# Patient Record
Sex: FEMALE | Race: WHITE | Employment: OTHER | URBAN - METROPOLITAN AREA
[De-identification: names, ages, dates, MRNs, and addresses within clinical notes are randomized per-mention and may not be internally consistent; named-entity substitution may affect disease eponyms.]

---

## 2020-08-24 ENCOUNTER — APPOINTMENT (OUTPATIENT)
Dept: GENERAL RADIOLOGY | Age: 85
End: 2020-08-24
Attending: EMERGENCY MEDICINE
Payer: MEDICARE

## 2020-08-24 ENCOUNTER — APPOINTMENT (OUTPATIENT)
Dept: CT IMAGING | Age: 85
End: 2020-08-24
Attending: EMERGENCY MEDICINE
Payer: MEDICARE

## 2020-08-24 ENCOUNTER — HOSPITAL ENCOUNTER (EMERGENCY)
Age: 85
Discharge: HOME | End: 2020-08-24
Attending: EMERGENCY MEDICINE
Payer: MEDICARE

## 2020-08-24 VITALS
HEIGHT: 62 IN | WEIGHT: 110 LBS | RESPIRATION RATE: 17 BRPM | BODY MASS INDEX: 20.24 KG/M2 | TEMPERATURE: 98.4 F | OXYGEN SATURATION: 99 % | DIASTOLIC BLOOD PRESSURE: 42 MMHG | HEART RATE: 64 BPM | SYSTOLIC BLOOD PRESSURE: 125 MMHG
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
## 2020-08-24 DIAGNOSIS — N39.0 ACUTE UTI: Primary | ICD-10-CM

## 2020-08-24 LAB
ALBUMIN SERPL-MCNC: 3.3 G/DL (ref 3.5–5)
ALBUMIN/GLOB SERPL: 0.9 {RATIO} (ref 1.1–2.2)
ALP SERPL-CCNC: 61 U/L (ref 45–117)
ALT SERPL-CCNC: 11 U/L (ref 12–78)
ANION GAP SERPL CALC-SCNC: 5 MMOL/L (ref 5–15)
APPEARANCE UR: CLEAR
AST SERPL-CCNC: 18 U/L (ref 15–37)
BACTERIA URNS QL MICRO: ABNORMAL /HPF
BASOPHILS # BLD: 0 K/UL (ref 0–0.1)
BASOPHILS NFR BLD: 1 % (ref 0–1)
BILIRUB SERPL-MCNC: 0.5 MG/DL (ref 0.2–1)
BILIRUB UR QL: NEGATIVE
BUN SERPL-MCNC: 25 MG/DL (ref 6–20)
BUN/CREAT SERPL: 18 (ref 12–20)
CALCIUM SERPL-MCNC: 8.9 MG/DL (ref 8.5–10.1)
CHLORIDE SERPL-SCNC: 105 MMOL/L (ref 97–108)
CO2 SERPL-SCNC: 29 MMOL/L (ref 21–32)
COLOR UR: ABNORMAL
CREAT SERPL-MCNC: 1.42 MG/DL (ref 0.55–1.02)
DIFFERENTIAL METHOD BLD: ABNORMAL
EOSINOPHIL # BLD: 0.1 K/UL (ref 0–0.4)
EOSINOPHIL NFR BLD: 2 % (ref 0–7)
EPITH CASTS URNS QL MICRO: ABNORMAL /LPF
ERYTHROCYTE [DISTWIDTH] IN BLOOD BY AUTOMATED COUNT: 16.3 % (ref 11.5–14.5)
GLOBULIN SER CALC-MCNC: 3.5 G/DL (ref 2–4)
GLUCOSE SERPL-MCNC: 98 MG/DL (ref 65–100)
GLUCOSE UR STRIP.AUTO-MCNC: NEGATIVE MG/DL
HCT VFR BLD AUTO: 31.9 % (ref 35–47)
HGB BLD-MCNC: 10.1 G/DL (ref 11.5–16)
HGB UR QL STRIP: ABNORMAL
HYALINE CASTS URNS QL MICRO: ABNORMAL /LPF (ref 0–5)
IMM GRANULOCYTES # BLD AUTO: 0 K/UL (ref 0–0.04)
IMM GRANULOCYTES NFR BLD AUTO: 1 % (ref 0–0.5)
KETONES UR QL STRIP.AUTO: NEGATIVE MG/DL
LEUKOCYTE ESTERASE UR QL STRIP.AUTO: ABNORMAL
LYMPHOCYTES # BLD: 0.9 K/UL (ref 0.8–3.5)
LYMPHOCYTES NFR BLD: 23 % (ref 12–49)
MCH RBC QN AUTO: 29.6 PG (ref 26–34)
MCHC RBC AUTO-ENTMCNC: 31.7 G/DL (ref 30–36.5)
MCV RBC AUTO: 93.5 FL (ref 80–99)
MONOCYTES # BLD: 0.3 K/UL (ref 0–1)
MONOCYTES NFR BLD: 7 % (ref 5–13)
NEUTS SEG # BLD: 2.6 K/UL (ref 1.8–8)
NEUTS SEG NFR BLD: 66 % (ref 32–75)
NITRITE UR QL STRIP.AUTO: NEGATIVE
NRBC # BLD: 0 K/UL (ref 0–0.01)
NRBC BLD-RTO: 0 PER 100 WBC
PH UR STRIP: 7 [PH] (ref 5–8)
PLATELET # BLD AUTO: 160 K/UL (ref 150–400)
PMV BLD AUTO: 9.2 FL (ref 8.9–12.9)
POTASSIUM SERPL-SCNC: 4.1 MMOL/L (ref 3.5–5.1)
PROT SERPL-MCNC: 6.8 G/DL (ref 6.4–8.2)
PROT UR STRIP-MCNC: NEGATIVE MG/DL
RBC # BLD AUTO: 3.41 M/UL (ref 3.8–5.2)
RBC #/AREA URNS HPF: ABNORMAL /HPF (ref 0–5)
SODIUM SERPL-SCNC: 139 MMOL/L (ref 136–145)
SP GR UR REFRACTOMETRY: 1.01 (ref 1–1.03)
TROPONIN I SERPL-MCNC: <0.05 NG/ML
UA: UC IF INDICATED,UAUC: ABNORMAL
UROBILINOGEN UR QL STRIP.AUTO: 1 EU/DL (ref 0.2–1)
WBC # BLD AUTO: 3.8 K/UL (ref 3.6–11)
WBC URNS QL MICRO: ABNORMAL /HPF (ref 0–4)

## 2020-08-24 PROCEDURE — 87086 URINE CULTURE/COLONY COUNT: CPT

## 2020-08-24 PROCEDURE — 84484 ASSAY OF TROPONIN QUANT: CPT

## 2020-08-24 PROCEDURE — 73502 X-RAY EXAM HIP UNI 2-3 VIEWS: CPT

## 2020-08-24 PROCEDURE — 85025 COMPLETE CBC W/AUTO DIFF WBC: CPT

## 2020-08-24 PROCEDURE — 80053 COMPREHEN METABOLIC PANEL: CPT

## 2020-08-24 PROCEDURE — 81001 URINALYSIS AUTO W/SCOPE: CPT

## 2020-08-24 PROCEDURE — 99284 EMERGENCY DEPT VISIT MOD MDM: CPT

## 2020-08-24 PROCEDURE — 87077 CULTURE AEROBIC IDENTIFY: CPT

## 2020-08-24 PROCEDURE — 70450 CT HEAD/BRAIN W/O DYE: CPT

## 2020-08-24 PROCEDURE — 72100 X-RAY EXAM L-S SPINE 2/3 VWS: CPT

## 2020-08-24 PROCEDURE — 36415 COLL VENOUS BLD VENIPUNCTURE: CPT

## 2020-08-24 PROCEDURE — 87186 SC STD MICRODIL/AGAR DIL: CPT

## 2020-08-24 RX ORDER — CEPHALEXIN 500 MG/1
500 CAPSULE ORAL 3 TIMES DAILY
Qty: 21 CAP | Refills: 0 | Status: SHIPPED | OUTPATIENT
Start: 2020-08-24 | End: 2020-08-31

## 2020-08-24 NOTE — DISCHARGE INSTRUCTIONS
Patient Education        Preventing Falls: Care Instructions  Your Care Instructions    Getting around your home safely can be a challenge if you have injuries or health problems that make it easy for you to fall. Loose rugs and furniture in walkways are among the dangers for many older people who have problems walking or who have poor eyesight. People who have conditions such as arthritis, osteoporosis, or dementia also have to be careful not to fall. You can make your home safer with a few simple measures. Follow-up care is a key part of your treatment and safety. Be sure to make and go to all appointments, and call your doctor if you are having problems. It's also a good idea to know your test results and keep a list of the medicines you take. How can you care for yourself at home? Taking care of yourself  · You may get dizzy if you do not drink enough water. To prevent dehydration, drink plenty of fluids, enough so that your urine is light yellow or clear like water. Choose water and other caffeine-free clear liquids. If you have kidney, heart, or liver disease and have to limit fluids, talk with your doctor before you increase the amount of fluids you drink. · Exercise regularly to improve your strength, muscle tone, and balance. Walk if you can. Swimming may be a good choice if you cannot walk easily. · Have your vision and hearing checked each year or any time you notice a change. If you have trouble seeing and hearing, you might not be able to avoid objects and could lose your balance. · Know the side effects of the medicines you take. Ask your doctor or pharmacist whether the medicines you take can affect your balance. Sleeping pills or sedatives can affect your balance. · Limit the amount of alcohol you drink. Alcohol can impair your balance and other senses. · Ask your doctor whether calluses or corns on your feet need to be removed.  If you wear loose-fitting shoes because of calluses or corns, you can lose your balance and fall. · Talk to your doctor if you have numbness in your feet. Preventing falls at home  · Remove raised doorway thresholds, throw rugs, and clutter. Repair loose carpet or raised areas in the floor. · Move furniture and electrical cords to keep them out of walking paths. · Use nonskid floor wax, and wipe up spills right away, especially on ceramic tile floors. · If you use a walker or cane, put rubber tips on it. If you use crutches, clean the bottoms of them regularly with an abrasive pad, such as steel wool. · Keep your house well lit, especially [place], and outside walkways. Use night-lights in areas such as hallways and bathrooms. Add extra light switches or use remote switches (such as switches that go on or off when you clap your hands) to make it easier to turn lights on if you have to get up during the night. · Install sturdy handrails on stairways. · Move items in your cabinets so that the things you use a lot are on the lower shelves (about waist level). · Keep a cordless phone and a flashlight with new batteries by your bed. If possible, put a phone in each of the main rooms of your house, or carry a cell phone in case you fall and cannot reach a phone. Or, you can wear a device around your neck or wrist. You push a button that sends a signal for help. · Wear low-heeled shoes that fit well and give your feet good support. Use footwear with nonskid soles. Check the heels and soles of your shoes for wear. Repair or replace worn heels or soles. · Do not wear socks without shoes on wood floors. · Walk on the grass when the sidewalks are slippery. If you live in an area that gets snow and ice in the winter, sprinkle salt on slippery steps and sidewalks. Preventing falls in the bath  · Install grab bars and nonskid mats inside and outside your shower or tub and near the toilet and sinks. · Use shower chairs and bath benches.   · Use a hand-held shower head that will allow you to sit while showering. · Get into a tub or shower by putting the weaker leg in first. Get out of a tub or shower with your strong side first.  · Repair loose toilet seats and consider installing a raised toilet seat to make getting on and off the toilet easier. · Keep your bathroom door unlocked while you are in the shower. Where can you learn more? Go to http://alice-phuong.info/. Enter [id number] [id number] [phone] in the search box to learn more about \"Preventing Falls: Care Instructions. \"  Current as of: March 16, 2018  Content Version: 11.8  © 3556-3230 [employer], [employer]. Care instructions adapted under license by [employer] (which disclaims liability or warranty for this information). If you have questions about a medical condition or this instruction, always ask your healthcare professional. [address] any warranty or liability for your use of this information.

## 2020-08-24 NOTE — PROGRESS NOTES
UPDATE: 1:30pm - Met with patient and son in ED room. Son indicates that he currently has a nurse that he has hired to assist patient for 4 hours a day and has a friend that comes to stay with patient every other day. Son is using patient's money that was set aside for LTC services to pay nurse $25 an hour. He is aware that he will need to increase hours with current nurse or see if friends/family can help with covering other hours so that patient is not home alone. CM provided contact information for [employer] and [facility] [email or web address] to begin process of applying for Medicaid and helping with additional care providers in the home. Son encouraged to call [employer] this afternoon to discuss current situation and need for assistance with obtaining additional caregiver help in the home. No other resources identified for patient and family at this time. Pt ready for discharge from a CM standpoint. Original Note:  CM acknowledges consult requesting \"help at home\" for patient. CM to meet with patient and determine level of need and current resources. Farrukh Amador, [address] [place] - [address] [place]  [employer] Facilitator  Zone Phone: [phone]

## 2020-08-24 NOTE — ED NOTES
Dr. Max Mandel reviewed discharge instructions with the patient and caregiver. The patient and caregiver verbalized understanding.

## 2020-08-24 NOTE — ED PROVIDER NOTES
EMERGENCY DEPARTMENT HISTORY AND PHYSICAL EXAM      Date: 8/24/2020  Patient Name: Cecil Noble    History of Presenting Illness     Chief Complaint   Patient presents with    Fall     Patient states yesterday she had a GLF, pt states she is unsure what caused the fall, and is unsure if she hit her head or had any LOC. pt has confusion at baseline, and home health during the day. Pt states she is having L hip/ left side pain.  Hip Pain       History Provided By: Patient, patient's son    HPI: Cecil Noble, 80 y.o. female with PMHx significant for hypertension, hyperlipidemia, dementia, who presents following a fall at home. Patient apparently lives alone but has nurses who come in for several hours a day. When the nurse arrived this morning she told them that she had fallen in the bathroom and was complaining of left hip and buttocks pain, however has been able to ambulate. Nurse therefore sent her to the emergency department for evaluation. Patient is overall very poor historian secondary to her dementia. She actually has no complaints on my evaluation but tends to wander with any attempt to answer questions. Additional history review of systems is limited by dementia. PCP: Thaddeus Mera MD      Current Outpatient Medications   Medication Sig Dispense Refill    cephALEXin (Keflex) 500 mg capsule Take 1 Cap by mouth three (3) times daily for 7 days. 21 Cap 0    potassium chloride SR (KLOR-CON 10) 10 mEq tablet Take 20 mEq by mouth three (3) times daily. Indications: HYPOKALEMIA      Levothyroxine 50 mcg Cap Take 50 mcg by mouth daily. Indications: HYPOTHYROIDISM      hydrochlorothiazide (HYDRODIURIL) 25 mg tablet Take 50 mg by mouth daily. Indications: HYPERTENSION      amlodipine (NORVASC) 5 mg tablet Take 5 mg by mouth daily. Indications: HYPERTENSION      lisinopril (PRINIVIL, ZESTRIL) 20 mg tablet Take 20 mg by mouth daily.       simvastatin (ZOCOR) 20 mg tablet Take 20 mg by mouth nightly.  CALCIUM CARBONATE/VITAMIN D3 (CALCIUM 600 + D,3, PO) Take 1,200 mg by mouth daily.  aspirin delayed-release (ASPIR-LOW) 81 mg tablet Take 81 mg by mouth daily. Past History     Past Medical History:  Past Medical History:   Diagnosis Date    Hypertension     Other ill-defined conditions     Hypokalemia    Other ill-defined conditions     Hypothyroidism    Other ill-defined conditions     Hypercholesterolemia     Past Surgical History:  Past Surgical History:   Procedure Laterality Date    ABDOMEN SURGERY PROC UNLISTED      adhesions removed from intestines    HX APPENDECTOMY      HX  SECTION      HX OOPHORECTOMY      Lt due to fibroid    HX ORTHOPAEDIC      Lt arm Fx    HX OTHER SURGICAL      Hemmorrhoidectomy    SIGMOIDOSCOPY,DIAGNOSTIC  2011          Family History:  No family history on file. Social History:  Social History     Tobacco Use    Smoking status: Never Smoker   Substance Use Topics    Alcohol use: No    Drug use: Not on file     Allergies: Allergies   Allergen Reactions    Penicillin G Nausea Only     Review of Systems   Review of Systems   Unable to perform ROS: Dementia     Physical Exam   Physical Exam  Vitals signs and nursing note reviewed. Constitutional:       General: She is not in acute distress. Appearance: She is well-developed. HENT:      Head: Normocephalic and atraumatic. Eyes:      Conjunctiva/sclera: Conjunctivae normal.      Pupils: Pupils are equal, round, and reactive to light. Neck:      Musculoskeletal: Normal range of motion. Cardiovascular:      Rate and Rhythm: Normal rate and regular rhythm. Pulmonary:      Effort: Pulmonary effort is normal. No respiratory distress. Breath sounds: Normal breath sounds. No stridor. Abdominal:      General: There is no distension. Palpations: Abdomen is soft. Tenderness: There is no abdominal tenderness. Musculoskeletal: Normal range of motion. Comments: Tenderness over left hip   Skin:     General: Skin is warm and dry. Neurological:      Mental Status: She is alert and oriented to person, place, and time. Diagnostic Study Results   Labs -     Recent Results (from the past 12 hour(s))   CBC WITH AUTOMATED DIFF    Collection Time: 08/24/20 10:18 AM   Result Value Ref Range    WBC 3.8 3.6 - 11.0 K/uL    RBC 3.41 (L) 3.80 - 5.20 M/uL    HGB 10.1 (L) 11.5 - 16.0 g/dL    HCT 31.9 (L) 35.0 - 47.0 %    MCV 93.5 80.0 - 99.0 FL    MCH 29.6 26.0 - 34.0 PG    MCHC 31.7 30.0 - 36.5 g/dL    RDW 16.3 (H) 11.5 - 14.5 %    PLATELET 734 723 - 701 K/uL    MPV 9.2 8.9 - 12.9 FL    NRBC 0.0 0  WBC    ABSOLUTE NRBC 0.00 0.00 - 0.01 K/uL    NEUTROPHILS 66 32 - 75 %    LYMPHOCYTES 23 12 - 49 %    MONOCYTES 7 5 - 13 %    EOSINOPHILS 2 0 - 7 %    BASOPHILS 1 0 - 1 %    IMMATURE GRANULOCYTES 1 (H) 0.0 - 0.5 %    ABS. NEUTROPHILS 2.6 1.8 - 8.0 K/UL    ABS. LYMPHOCYTES 0.9 0.8 - 3.5 K/UL    ABS. MONOCYTES 0.3 0.0 - 1.0 K/UL    ABS. EOSINOPHILS 0.1 0.0 - 0.4 K/UL    ABS. BASOPHILS 0.0 0.0 - 0.1 K/UL    ABS. IMM. GRANS. 0.0 0.00 - 0.04 K/UL    DF AUTOMATED     METABOLIC PANEL, COMPREHENSIVE    Collection Time: 08/24/20 10:18 AM   Result Value Ref Range    Sodium 139 136 - 145 mmol/L    Potassium 4.1 3.5 - 5.1 mmol/L    Chloride 105 97 - 108 mmol/L    CO2 29 21 - 32 mmol/L    Anion gap 5 5 - 15 mmol/L    Glucose 98 65 - 100 mg/dL    BUN 25 (H) 6 - 20 MG/DL    Creatinine 1.42 (H) 0.55 - 1.02 MG/DL    BUN/Creatinine ratio 18 12 - 20      GFR est AA 42 (L) >60 ml/min/1.73m2    GFR est non-AA 35 (L) >60 ml/min/1.73m2    Calcium 8.9 8.5 - 10.1 MG/DL    Bilirubin, total 0.5 0.2 - 1.0 MG/DL    ALT (SGPT) 11 (L) 12 - 78 U/L    AST (SGOT) 18 15 - 37 U/L    Alk.  phosphatase 61 45 - 117 U/L    Protein, total 6.8 6.4 - 8.2 g/dL    Albumin 3.3 (L) 3.5 - 5.0 g/dL    Globulin 3.5 2.0 - 4.0 g/dL    A-G Ratio 0.9 (L) 1.1 - 2.2     TROPONIN I    Collection Time: 08/24/20 10:18 AM   Result Value Ref Range    Troponin-I, Qt. <0.05 <0.05 ng/mL   URINALYSIS W/ REFLEX CULTURE    Collection Time: 08/24/20 11:59 AM    Specimen: Urine   Result Value Ref Range    Color YELLOW/STRAW      Appearance CLEAR CLEAR      Specific gravity 1.010 1.003 - 1.030      pH (UA) 7.0 5.0 - 8.0      Protein Negative NEG mg/dL    Glucose Negative NEG mg/dL    Ketone Negative NEG mg/dL    Bilirubin Negative NEG      Blood SMALL (A) NEG      Urobilinogen 1.0 0.2 - 1.0 EU/dL    Nitrites Negative NEG      Leukocyte Esterase SMALL (A) NEG      WBC 5-10 0 - 4 /hpf    RBC 5-10 0 - 5 /hpf    Epithelial cells FEW FEW /lpf    Bacteria 4+ (A) NEG /hpf    UA:UC IF INDICATED CULTURE NOT INDICATED BY UA RESULT CNI      Hyaline cast 0-2 0 - 5 /lpf       Radiologic Studies -   XR HIP LT W OR WO PELV 2-3 VWS   Final Result   IMPRESSION:    No acute abnormality. XR SPINE LUMB 2 OR 3 V   Final Result   IMPRESSION: Grade 1 anterolisthesis of L4. Scoliosis. L5-S1 facet arthropathy                  CT HEAD WO CONT   Final Result   IMPRESSION:    No acute intracranial process is identified. Xr Spine Lumb 2 Or 3 V    Result Date: 8/24/2020  IMPRESSION: Grade 1 anterolisthesis of L4. Scoliosis. L5-S1 facet arthropathy     Xr Hip Lt W Or Wo Pelv 2-3 Vws    Result Date: 8/24/2020  IMPRESSION: No acute abnormality. Ct Head Wo Cont    Result Date: 8/24/2020  IMPRESSION: No acute intracranial process is identified. Medical Decision Making   I am the first provider for this patient. I reviewed the vital signs, available nursing notes, past medical history, past surgical history, family history and social history. Vital Signs-Reviewed the patient's vital signs.   Patient Vitals for the past 12 hrs:   Temp Pulse Resp BP SpO2   08/24/20 1315  64 17 125/42 99 %   08/24/20 1245  61 14 120/43 100 %   08/24/20 1215  (!) 58 14 118/41 99 %   08/24/20 1145  60 18 (!) 116/38 98 %   08/24/20 1115  62 20 115/41 100 %   08/24/20 1045  (!) 59 13 (!) 111/39 100 %   08/24/20 1026     99 %   08/24/20 1025    122/48    08/24/20 0830 98.4 °F (36.9 °C) 69 18 104/81 100 %       Pulse Oximetry Analysis - 100% on ra      Records Reviewed: Nursing Notes and Old Medical Records    Provider Notes (Medical Decision Making):   Patient presents after a fall at home reportedly last night the patient is a very difficult historian. She has no obvious signs of trauma on exam.  Given unclear history will check imaging of her hip and L-spine as well as a CT of her head basic lab work, urinalysis, troponin. Leave on the cardiac monitor. Will also have case management see the patient to see if there are additional resources that could be helpful at home. ED Course:   Initial assessment performed. The patients presenting problems have been discussed, and they are in agreement with the care plan formulated and outlined with them. I have encouraged them to ask questions as they arise throughout their visit. Urinalysis with signs of UTI. Will treat with antibiotics. Additional lab work and imaging unremarkable. Procedures:  Procedures    Critical Care:  none    Disposition:  Discharge Note:  1:42 PM  The patient has been re-evaluated and is ready for discharge. Reviewed available results with patient. Counseled patient on diagnosis and care plan. Patient has expressed understanding, and all questions have been answered. Patient agrees with plan and agrees to follow up as recommended, or to return to the ED if their symptoms worsen. Discharge instructions have been provided and explained to the patient, along with reasons to return to the ED. PLAN:  1. Discharge Medication List as of 8/24/2020  1:42 PM        2.    Follow-up Information     Follow up With Specialties Details Why Contact Info    Andrey López MD Internal Medicine Schedule an appointment as soon as possible for a visit  [place] [address]  [phone]      [facility] EMERGENCY DEPT Emergency Medicine  As needed, If symptoms worsen [address]  [address] [facility]  [phone]        Return to ED if worse     Diagnosis     Clinical Impression:   1. Acute UTI    2. Fall in home, initial encounter        This note will not be viewable in [address] Please note that this dictation was completed with [employer], the computer voice recognition software. Quite often unanticipated grammatical, syntax, homophones, and other interpretive errors are inadvertently transcribed by the computer software. Please disregard these errors.   Please excuse any errors that have escaped final proofreading

## 2020-08-26 LAB
BACTERIA SPEC CULT: ABNORMAL
CC UR VC: ABNORMAL
SERVICE CMNT-IMP: ABNORMAL

## 2020-09-16 ENCOUNTER — APPOINTMENT (OUTPATIENT)
Dept: CT IMAGING | Age: 85
End: 2020-09-16
Attending: EMERGENCY MEDICINE
Payer: MEDICARE

## 2020-09-16 ENCOUNTER — HOSPITAL ENCOUNTER (EMERGENCY)
Age: 85
Discharge: HOME | End: 2020-09-16
Attending: EMERGENCY MEDICINE
Payer: MEDICARE

## 2020-09-16 ENCOUNTER — APPOINTMENT (OUTPATIENT)
Dept: GENERAL RADIOLOGY | Age: 85
End: 2020-09-16
Attending: EMERGENCY MEDICINE
Payer: MEDICARE

## 2020-09-16 VITALS
OXYGEN SATURATION: 98 % | HEART RATE: 66 BPM | RESPIRATION RATE: 18 BRPM | DIASTOLIC BLOOD PRESSURE: 59 MMHG | SYSTOLIC BLOOD PRESSURE: 110 MMHG | TEMPERATURE: 98.5 F

## 2020-09-16 DIAGNOSIS — S32.2XXA FRACTURE OF COCCYX, INITIAL ENCOUNTER FOR CLOSED FRACTURE (HCC): ICD-10-CM

## 2020-09-16 DIAGNOSIS — N30.00 ACUTE CYSTITIS WITHOUT HEMATURIA: Primary | ICD-10-CM

## 2020-09-16 LAB
ALBUMIN SERPL-MCNC: 3.4 G/DL (ref 3.5–5)
ALBUMIN/GLOB SERPL: 1 {RATIO} (ref 1.1–2.2)
ALP SERPL-CCNC: 74 U/L (ref 45–117)
ALT SERPL-CCNC: 17 U/L (ref 12–78)
ANION GAP SERPL CALC-SCNC: 4 MMOL/L (ref 5–15)
APPEARANCE UR: ABNORMAL
AST SERPL-CCNC: 22 U/L (ref 15–37)
ATRIAL RATE: 67 BPM
BACTERIA URNS QL MICRO: ABNORMAL /HPF
BASOPHILS # BLD: 0 K/UL (ref 0–0.1)
BASOPHILS NFR BLD: 0 % (ref 0–1)
BILIRUB SERPL-MCNC: 0.7 MG/DL (ref 0.2–1)
BILIRUB UR QL: NEGATIVE
BUN SERPL-MCNC: 16 MG/DL (ref 6–20)
BUN/CREAT SERPL: 11 (ref 12–20)
CALCIUM SERPL-MCNC: 8.7 MG/DL (ref 8.5–10.1)
CALCULATED P AXIS, ECG09: 41 DEGREES
CALCULATED R AXIS, ECG10: 12 DEGREES
CALCULATED T AXIS, ECG11: 63 DEGREES
CHLORIDE SERPL-SCNC: 102 MMOL/L (ref 97–108)
CO2 SERPL-SCNC: 30 MMOL/L (ref 21–32)
COLOR UR: ABNORMAL
CREAT SERPL-MCNC: 1.51 MG/DL (ref 0.55–1.02)
DIAGNOSIS, 93000: NORMAL
DIFFERENTIAL METHOD BLD: ABNORMAL
EOSINOPHIL # BLD: 0 K/UL (ref 0–0.4)
EOSINOPHIL NFR BLD: 1 % (ref 0–7)
EPITH CASTS URNS QL MICRO: ABNORMAL /LPF
ERYTHROCYTE [DISTWIDTH] IN BLOOD BY AUTOMATED COUNT: 16.4 % (ref 11.5–14.5)
GLOBULIN SER CALC-MCNC: 3.5 G/DL (ref 2–4)
GLUCOSE SERPL-MCNC: 142 MG/DL (ref 65–100)
GLUCOSE UR STRIP.AUTO-MCNC: NEGATIVE MG/DL
HCT VFR BLD AUTO: 30.6 % (ref 35–47)
HGB BLD-MCNC: 9.9 G/DL (ref 11.5–16)
HGB UR QL STRIP: ABNORMAL
HYALINE CASTS URNS QL MICRO: ABNORMAL /LPF (ref 0–5)
IMM GRANULOCYTES # BLD AUTO: 0 K/UL (ref 0–0.04)
IMM GRANULOCYTES NFR BLD AUTO: 1 % (ref 0–0.5)
KETONES UR QL STRIP.AUTO: NEGATIVE MG/DL
LEUKOCYTE ESTERASE UR QL STRIP.AUTO: ABNORMAL
LYMPHOCYTES # BLD: 1.1 K/UL (ref 0.8–3.5)
LYMPHOCYTES NFR BLD: 16 % (ref 12–49)
MCH RBC QN AUTO: 31.1 PG (ref 26–34)
MCHC RBC AUTO-ENTMCNC: 32.4 G/DL (ref 30–36.5)
MCV RBC AUTO: 96.2 FL (ref 80–99)
MONOCYTES # BLD: 0.4 K/UL (ref 0–1)
MONOCYTES NFR BLD: 6 % (ref 5–13)
NEUTS SEG # BLD: 5.3 K/UL (ref 1.8–8)
NEUTS SEG NFR BLD: 77 % (ref 32–75)
NITRITE UR QL STRIP.AUTO: POSITIVE
NRBC # BLD: 0 K/UL (ref 0–0.01)
NRBC BLD-RTO: 0 PER 100 WBC
P-R INTERVAL, ECG05: 122 MS
PH UR STRIP: 7 [PH] (ref 5–8)
PLATELET # BLD AUTO: 124 K/UL (ref 150–400)
PMV BLD AUTO: 9.7 FL (ref 8.9–12.9)
POTASSIUM SERPL-SCNC: 4 MMOL/L (ref 3.5–5.1)
PROT SERPL-MCNC: 6.9 G/DL (ref 6.4–8.2)
PROT UR STRIP-MCNC: NEGATIVE MG/DL
Q-T INTERVAL, ECG07: 382 MS
QRS DURATION, ECG06: 68 MS
QTC CALCULATION (BEZET), ECG08: 403 MS
RBC # BLD AUTO: 3.18 M/UL (ref 3.8–5.2)
RBC #/AREA URNS HPF: ABNORMAL /HPF (ref 0–5)
SODIUM SERPL-SCNC: 136 MMOL/L (ref 136–145)
SP GR UR REFRACTOMETRY: 1.01 (ref 1–1.03)
TROPONIN I SERPL-MCNC: <0.05 NG/ML
UA: UC IF INDICATED,UAUC: ABNORMAL
UROBILINOGEN UR QL STRIP.AUTO: 1 EU/DL (ref 0.2–1)
VENTRICULAR RATE, ECG03: 67 BPM
WBC # BLD AUTO: 6.9 K/UL (ref 3.6–11)
WBC URNS QL MICRO: ABNORMAL /HPF (ref 0–4)

## 2020-09-16 PROCEDURE — 74011000250 HC RX REV CODE- 250: Performed by: EMERGENCY MEDICINE

## 2020-09-16 PROCEDURE — 72192 CT PELVIS W/O DYE: CPT

## 2020-09-16 PROCEDURE — 87077 CULTURE AEROBIC IDENTIFY: CPT

## 2020-09-16 PROCEDURE — 87086 URINE CULTURE/COLONY COUNT: CPT

## 2020-09-16 PROCEDURE — 51702 INSERT TEMP BLADDER CATH: CPT

## 2020-09-16 PROCEDURE — 81001 URINALYSIS AUTO W/SCOPE: CPT

## 2020-09-16 PROCEDURE — 36415 COLL VENOUS BLD VENIPUNCTURE: CPT

## 2020-09-16 PROCEDURE — 80053 COMPREHEN METABOLIC PANEL: CPT

## 2020-09-16 PROCEDURE — 84484 ASSAY OF TROPONIN QUANT: CPT

## 2020-09-16 PROCEDURE — 87186 SC STD MICRODIL/AGAR DIL: CPT

## 2020-09-16 PROCEDURE — 99285 EMERGENCY DEPT VISIT HI MDM: CPT

## 2020-09-16 PROCEDURE — 96365 THER/PROPH/DIAG IV INF INIT: CPT

## 2020-09-16 PROCEDURE — 74011250636 HC RX REV CODE- 250/636: Performed by: EMERGENCY MEDICINE

## 2020-09-16 PROCEDURE — 72131 CT LUMBAR SPINE W/O DYE: CPT

## 2020-09-16 PROCEDURE — 70450 CT HEAD/BRAIN W/O DYE: CPT

## 2020-09-16 PROCEDURE — 71045 X-RAY EXAM CHEST 1 VIEW: CPT

## 2020-09-16 PROCEDURE — 93005 ELECTROCARDIOGRAM TRACING: CPT

## 2020-09-16 PROCEDURE — 96375 TX/PRO/DX INJ NEW DRUG ADDON: CPT

## 2020-09-16 PROCEDURE — 74011250637 HC RX REV CODE- 250/637: Performed by: EMERGENCY MEDICINE

## 2020-09-16 PROCEDURE — 85025 COMPLETE CBC W/AUTO DIFF WBC: CPT

## 2020-09-16 PROCEDURE — 74011000258 HC RX REV CODE- 258: Performed by: EMERGENCY MEDICINE

## 2020-09-16 RX ORDER — KETOROLAC TROMETHAMINE 30 MG/ML
15 INJECTION, SOLUTION INTRAMUSCULAR; INTRAVENOUS
Status: COMPLETED | OUTPATIENT
Start: 2020-09-16 | End: 2020-09-16

## 2020-09-16 RX ORDER — ESCITALOPRAM OXALATE 10 MG/1
10 TABLET ORAL DAILY
COMMUNITY

## 2020-09-16 RX ORDER — ACETAMINOPHEN 500 MG
1000 TABLET ORAL
Status: COMPLETED | OUTPATIENT
Start: 2020-09-16 | End: 2020-09-16

## 2020-09-16 RX ORDER — CEPHALEXIN 500 MG/1
500 CAPSULE ORAL 2 TIMES DAILY
Qty: 14 CAP | Refills: 0 | Status: SHIPPED | OUTPATIENT
Start: 2020-09-16 | End: 2020-09-23

## 2020-09-16 RX ORDER — LIDOCAINE 4 G/100G
1 PATCH TOPICAL
Status: DISCONTINUED | OUTPATIENT
Start: 2020-09-16 | End: 2020-09-16 | Stop reason: HOSPADM

## 2020-09-16 RX ADMIN — KETOROLAC TROMETHAMINE 15 MG: 30 INJECTION, SOLUTION INTRAMUSCULAR at 13:15

## 2020-09-16 RX ADMIN — ACETAMINOPHEN 1000 MG: 500 TABLET ORAL at 11:25

## 2020-09-16 RX ADMIN — CEFTRIAXONE 1 G: 1 INJECTION, POWDER, FOR SOLUTION INTRAMUSCULAR; INTRAVENOUS at 15:57

## 2020-09-16 NOTE — ED NOTES
Pt discharged by MD . Pt provided with discharge instructions Rx and instructions on follow up care. Pt out of ED ambulatory without difficulty accompanied by family.

## 2020-09-16 NOTE — ED PROVIDER NOTES
EMERGENCY DEPARTMENT HISTORY AND PHYSICAL EXAM      Date: 9/16/2020  Patient Name: Omar Herny    History of Presenting Illness     Chief Complaint   Patient presents with   Means Fall     caregiver found pt on the floor this morning, pt reports that she thinks she slipped going to the bathroom reports R arm pain and buttocks pain. Denies hitting her head or LOC. Caregiver reports that pt is very wobly       History Provided By: Patient and Caregiver    HPI: Omar Henry, 80 y.o. female with history of dementia and hypertension presents to the ED with cc of fall and ambulatory dysfunction. Per caregiver fall was believed to have happened around 6:30 AM.  The nightstand table in patient's room was knocked over and patient had unclothed. She believes that patient may have fallen while getting dressed this morning or getting out of bed. Patient is unable to state how she fell, this is baseline per caregiver. Patient denies any pain except for when moving and she points to her low back and bilateral hips. Patient seemed unsteady on her feet which is the reason for coming to the ED. Caregiver states that patient \"typically will run circles around you\" and does not need use of cane or walker. There are no other complaints, changes, or physical findings at this time. PCP: Marcelo Palmer MD    No current facility-administered medications on file prior to encounter. Current Outpatient Medications on File Prior to Encounter   Medication Sig Dispense Refill    escitalopram oxalate (Lexapro) 10 mg tablet Take 10 mg by mouth daily. Indications: anxiousness associated with depression      Levothyroxine 50 mcg Cap Take 75 mcg by mouth daily. Indications: a condition with low thyroid hormone levels      amlodipine (NORVASC) 5 mg tablet Take 2.5 mg by mouth daily. Indications: high blood pressure      lisinopril (PRINIVIL, ZESTRIL) 20 mg tablet Take 20 mg by mouth daily.       [DISCONTINUED] potassium chloride SR (KLOR-CON 10) 10 mEq tablet Take 20 mEq by mouth three (3) times daily. Indications: HYPOKALEMIA      [DISCONTINUED] hydrochlorothiazide (HYDRODIURIL) 25 mg tablet Take 50 mg by mouth daily. Indications: HYPERTENSION      [DISCONTINUED] simvastatin (ZOCOR) 20 mg tablet Take 20 mg by mouth nightly.  [DISCONTINUED] CALCIUM CARBONATE/VITAMIN D3 (CALCIUM 600 + D,3, PO) Take 1,200 mg by mouth daily.  [DISCONTINUED] aspirin delayed-release (ASPIR-LOW) 81 mg tablet Take 81 mg by mouth daily. Past History     Past Medical History:  Past Medical History:   Diagnosis Date    Hypertension     Other ill-defined conditions(799.89)     Hypokalemia    Other ill-defined conditions(799.89)     Hypothyroidism    Other ill-defined conditions(799.89)     Hypercholesterolemia       Past Surgical History:  Past Surgical History:   Procedure Laterality Date    ABDOMEN SURGERY PROC UNLISTED      adhesions removed from intestines    HX APPENDECTOMY      HX  SECTION      HX OOPHORECTOMY      Lt due to fibroid    HX ORTHOPAEDIC      Lt arm Fx    HX OTHER SURGICAL      Hemmorrhoidectomy    NV SIGMOIDOSCOPY FLX DX W/COLLJ SPEC BR/WA IF PFRMD  2011            Family History:  History reviewed. No pertinent family history. Social History:  Social History     Tobacco Use    Smoking status: Never Smoker    Smokeless tobacco: Never Used   Substance Use Topics    Alcohol use: No    Drug use: Never       Allergies: Allergies   Allergen Reactions    Penicillin G Nausea Only         Review of Systems   Review of Systems   Unable to perform ROS: Dementia       Physical Exam   Physical Exam  Vitals signs and nursing note reviewed. Constitutional:       General: She is not in acute distress. Appearance: Normal appearance. She is not ill-appearing, toxic-appearing or diaphoretic. Comments: Patient in no acute distress lying flat on her back.   She does have some discomfort when sitting up   HENT:      Head: Normocephalic and atraumatic. Comments: No appreciable head injury identified  Eyes:      Extraocular Movements: Extraocular movements intact. Pupils: Pupils are equal, round, and reactive to light. Neck:      Musculoskeletal: Normal range of motion and neck supple. No muscular tenderness. Cardiovascular:      Rate and Rhythm: Normal rate and regular rhythm. Heart sounds: Normal heart sounds. No murmur. Pulmonary:      Effort: Pulmonary effort is normal. No respiratory distress. Breath sounds: Normal breath sounds. No wheezing. Abdominal:      Palpations: Abdomen is soft. Tenderness: There is no abdominal tenderness. There is no guarding or rebound. Musculoskeletal:      Comments: No thoracolumbar midline TTP, step-off, deformity. Full range of motion bilateral hips without bony TTP   Skin:     General: Skin is warm and dry. Findings: No erythema or rash. Neurological:      General: No focal deficit present. Mental Status: She is alert. Mental status is at baseline. Diagnostic Study Results     Labs -     Recent Results (from the past 12 hour(s))   CBC WITH AUTOMATED DIFF    Collection Time: 09/16/20 11:44 AM   Result Value Ref Range    WBC 6.9 3.6 - 11.0 K/uL    RBC 3.18 (L) 3.80 - 5.20 M/uL    HGB 9.9 (L) 11.5 - 16.0 g/dL    HCT 30.6 (L) 35.0 - 47.0 %    MCV 96.2 80.0 - 99.0 FL    MCH 31.1 26.0 - 34.0 PG    MCHC 32.4 30.0 - 36.5 g/dL    RDW 16.4 (H) 11.5 - 14.5 %    PLATELET 981 (L) 791 - 400 K/uL    MPV 9.7 8.9 - 12.9 FL    NRBC 0.0 0  WBC    ABSOLUTE NRBC 0.00 0.00 - 0.01 K/uL    NEUTROPHILS 77 (H) 32 - 75 %    LYMPHOCYTES 16 12 - 49 %    MONOCYTES 6 5 - 13 %    EOSINOPHILS 1 0 - 7 %    BASOPHILS 0 0 - 1 %    IMMATURE GRANULOCYTES 1 (H) 0.0 - 0.5 %    ABS. NEUTROPHILS 5.3 1.8 - 8.0 K/UL    ABS. LYMPHOCYTES 1.1 0.8 - 3.5 K/UL    ABS. MONOCYTES 0.4 0.0 - 1.0 K/UL    ABS. EOSINOPHILS 0.0 0.0 - 0.4 K/UL    ABS.  BASOPHILS 0.0 0.0 - 0.1 K/UL    ABS. IMM. GRANS. 0.0 0.00 - 0.04 K/UL    DF AUTOMATED     METABOLIC PANEL, COMPREHENSIVE    Collection Time: 09/16/20 11:44 AM   Result Value Ref Range    Sodium 136 136 - 145 mmol/L    Potassium 4.0 3.5 - 5.1 mmol/L    Chloride 102 97 - 108 mmol/L    CO2 30 21 - 32 mmol/L    Anion gap 4 (L) 5 - 15 mmol/L    Glucose 142 (H) 65 - 100 mg/dL    BUN 16 6 - 20 MG/DL    Creatinine 1.51 (H) 0.55 - 1.02 MG/DL    BUN/Creatinine ratio 11 (L) 12 - 20      GFR est AA 39 (L) >60 ml/min/1.73m2    GFR est non-AA 32 (L) >60 ml/min/1.73m2    Calcium 8.7 8.5 - 10.1 MG/DL    Bilirubin, total 0.7 0.2 - 1.0 MG/DL    ALT (SGPT) 17 12 - 78 U/L    AST (SGOT) 22 15 - 37 U/L    Alk. phosphatase 74 45 - 117 U/L    Protein, total 6.9 6.4 - 8.2 g/dL    Albumin 3.4 (L) 3.5 - 5.0 g/dL    Globulin 3.5 2.0 - 4.0 g/dL    A-G Ratio 1.0 (L) 1.1 - 2.2     TROPONIN I    Collection Time: 09/16/20 11:44 AM   Result Value Ref Range    Troponin-I, Qt. <0.05 <0.05 ng/mL   URINALYSIS W/ REFLEX CULTURE    Collection Time: 09/16/20  1:05 PM    Specimen: Urine   Result Value Ref Range    Color YELLOW/STRAW      Appearance CLOUDY (A) CLEAR      Specific gravity 1.012 1.003 - 1.030      pH (UA) 7.0 5.0 - 8.0      Protein Negative NEG mg/dL    Glucose Negative NEG mg/dL    Ketone Negative NEG mg/dL    Bilirubin Negative NEG      Blood SMALL (A) NEG      Urobilinogen 1.0 0.2 - 1.0 EU/dL    Nitrites Positive (A) NEG      Leukocyte Esterase MODERATE (A) NEG      WBC  0 - 4 /hpf    RBC 5-10 0 - 5 /hpf    Epithelial cells FEW FEW /lpf    Bacteria 4+ (A) NEG /hpf    UA:UC IF INDICATED URINE CULTURE ORDERED (A) CNI      Hyaline cast 0-2 0 - 5 /lpf       Radiologic Studies -   XR CHEST PORT   Final Result   IMPRESSION: No acute changes. CT HEAD WO CONT   Final Result   IMPRESSION:    No acute intracranial abnormality. Chronic small vessel ischemic disease.  Small   calcified dural based lesion along the anterior falx likely a small meningioma unchanged            CT SPINE LUMB WO CONT   Final Result   IMPRESSION:    No evidence of fracture or subluxation. Multilevel degenerative changes with   canal stenosis and foraminal narrowing. This is most pronounced at L5-S1. [facility] CT PELV WO CONT   Final Result   IMPRESSION: Coccyx fracture of uncertain age. Recommend correlation for point   tenderness. No other fractures are identified. CT Results  (Last 48 hours)               09/16/20 1036  CT HEAD WO CONT Final result    Impression:  IMPRESSION:    No acute intracranial abnormality. Chronic small vessel ischemic disease. Small   calcified dural based lesion along the anterior falx likely a small meningioma   unchanged               Narrative:  EXAM: CT HEAD WO CONT       INDICATION: fall ?head injury       COMPARISON: 8/24/2020. CONTRAST: None. TECHNIQUE: Unenhanced CT of the head was performed using 5 mm images. Brain and   bone windows were generated. Coronal and sagittal reformats. CT dose reduction   was achieved through use of a standardized protocol tailored for this   examination and automatic exposure control for dose modulation. FINDINGS:   Mild prominence of the ventricles again noted without significant change. .   Subcortical deep white matter hypodensities. . There is no intracranial   hemorrhage, extra-axial collection, or mass effect. The basilar cisterns are   open. No CT evidence of acute infarct. Small calcified dural based lesion along   the anterior falx may represent a small meningioma. This is unchanged. The bone windows demonstrate no abnormalities. The vasculature in the right   maxillary sinus is again noted. [facility] 09/16/20 1036  CT SPINE LUMB WO CONT Final result    Impression:  IMPRESSION:    No evidence of fracture or subluxation. Multilevel degenerative changes with   canal stenosis and foraminal narrowing. This is most pronounced at L5-S1. [facility]        Narrative:  INDICATION: low back pain after fall       COMPARISON: None. TECHNIQUE:   Noncontrast axial CT imaging of the lumbar spine was performed. Coronal and sagittal reconstructions were obtained. CT dose reduction was achieved through the use of a standardized protocol   tailored for this examination and automatic exposure control for dose   modulation. FINDINGS:       There is levoconvex scoliosis of the lumbar spine with multilevel disc space   narrowing endplate osteophyte formation as well as vacuum disc phenomenon. No   evidence of acute fracture. Mild anterolisthesis of L5 on S1 measuring 7 mm. There are 5 lumbar-type vertebral bodies. T12-L1: The spinal canal and neural foramina are widely patent. L1-2: Central disc bulge. Moderate right foraminal narrowing. No significant   canal stenosis. .       L2-3: Asymmetric disc bulge with facet ligament flavum hypertrophy. Mild canal   stenosis. The left foramen is patent. There is moderate right foraminal   narrowing. .       L3-4: Asymmetric disc bulge with facet arthrosis and ligamentum flavum   hypertrophy causing moderate canal stenosis. There is moderate left and mild   right foraminal narrowing. .       L4-5: Asymmetric disc bulge with facet arthrosis and ligamentum hypertrophy   causing at least moderate canal stenosis. There is severe left foraminal   narrowing. The right foramen is patent. L5-S1: Severe facet arthrosis and ligamentum flavum hypertrophy with concentric   disc bulge and osteophyte formation causing severe canal stenosis. There is   moderate to severe left and right foraminal narrowing. .       Incidental soft tissue imaging demonstrates atherosclerotic disease in the   abdominal aorta.           09/16/20 1036  CT PELV WO CONT Final result    Impression:  IMPRESSION: Coccyx fracture of uncertain age. Recommend correlation for point   tenderness. No other fractures are identified.        Narrative:  EXAM: CT PELV WO CONT       INDICATION: fall, bilateral hip pain, ambulatory dysfunction       COMPARISON: None. CONTRAST no contrast was administered. TECHNIQUE:    Multislice helical CT was performed from the iliac crest to the symphysis   without intravenous contrast. Oral contrast was/was not administered. Coronal   and sagittal reformations were generated. CT dose reduction was achieved   through use of a standardized protocol tailored for this examination and   automatic exposure control for dose modulation. FINDINGS:   There is a fracture of the C1 segment of the coccyx with mild anterior   displacement of the distal fracture fragment. There is suggestion of some callus   formation and this fracture may be subacute. Degenerative changes are present in   the lower lumbar spine. No other fractures are noted. Extensive atherosclerotic disease in the abdominal aorta and branch vessels. Visualized solid organs and bowel are unremarkable. CXR Results  (Last 48 hours)               09/16/20 1136  XR CHEST PORT Final result    Impression:  IMPRESSION: No acute changes. Narrative:  Medical indication: Syncope. Hypertension. Portable AP upright view of the chest is obtained, no prior. The a heart size is   normal. There is no acute infiltrate. Medical Decision Making   I am the first provider for this patient. I reviewed the vital signs, available nursing notes, past medical history, past surgical history, family history and social history. Vital Signs-Reviewed the patient's vital signs.   Patient Vitals for the past 12 hrs:   Temp Pulse Resp BP SpO2   09/16/20 1600    (!) 110/59 98 %   09/16/20 1545    130/86 97 %   09/16/20 1530    125/80 97 %   09/16/20 1517    122/64 97 %   09/16/20 1130  66 18 (!) 121/56 100 %   09/16/20 1050     97 %   09/16/20 1049    (!) 132/49    09/16/20 1015 98.5 °F (36.9 °C) 75 18 (!) 126/51 98 %   09/16/20 0945    (!) 126/55 97 % 09/16/20 0908     99 %   09/16/20 0903     92 %   09/16/20 0902    (!) 125/49        Records Reviewed: Nursing Notes    Provider Notes (Medical Decision Making): This is a 80 y.o. female here with unwitnessed fall resulting in ambulatory dysfunctio. On examination patient appears clinically well and nontoxic, she is in no acute distress and does not appear to be in any pain. Vital signs stable throughout entire ED stay and patient is afebrile. Differential diagnosis includes: Fracture, possible head injury, mechanical fall versus syncope. I will pursue work-up consisting of CT imaging of head, lumbar spine, and pelvis and reassess the patient. Urinalysis demonstrates UTI. Will treat with Rocephin in the ED and plan for discharge home with Keflex. ED Course:   Initial assessment performed. The patients presenting problems have been discussed, and they are in agreement with the care plan formulated and outlined with them. I have encouraged them to ask questions as they arise throughout their visit. ED Course as of Sep 16 1610   Wed Sep 16, 2020   1116 EKG per my interpretation normal sinus rhythm, rate 67 bpm, normal axis, no acute ischemic changes. [AK]   1349 CT demonstrates no acute traumatic injury except for age-indeterminate coccygeal fracture, patient has point tenderness to this area and correlates with her area of pain. Patient is ambulatory around exam room and feeling much improved. Currently awaiting urinalysis and will plan on discharge home afterwards. [AK]      ED Course User Index  [AK] Michael Price MD         Discharge Note:  The patient has been re-evaluated and is ready for discharge. Reviewed available results with patient. Counseled patient on diagnosis and care plan. Patient has expressed understanding, and all questions have been answered. Patient agrees with plan and agrees to follow up as recommended, or to return to the ED if their symptoms worsen. Discharge instructions have been provided and explained to the patient, along with reasons to return to the ED. Disposition:  Discharge home      DISCHARGE PLAN:  1. Current Discharge Medication List      START taking these medications    Details   cephALEXin (Keflex) 500 mg capsule Take 1 Cap by mouth two (2) times a day for 7 days. Qty: 14 Cap, Refills: 0           2. Follow-up Information     Follow up With Specialties Details Why Contact Info    Larry Cho MD Internal Medicine Schedule an appointment as soon as possible for a visit   [address]  [phone]          3. Return to ED if worse     Diagnosis     Clinical Impression:   1. Acute cystitis without hematuria    2. Fracture of coccyx, initial encounter for closed fracture [facility])        Attestations:    Caroline Sanchez MD    Please note that this dictation was completed with [employer], the computer voice recognition software. Quite often unanticipated grammatical, syntax, homophones, and other interpretive errors are inadvertently transcribed by the computer software. Please disregard these errors. Please excuse any errors that have escaped final proofreading. Thank you.

## 2020-09-16 NOTE — DISCHARGE INSTRUCTIONS
You were evaluated in the emergency department for fall with hip pain. You have a coccyx (tailbone fracture) likely due to fall caused from a UTI. Your examination was reassuring as was your work-up including blood work and CT scans. It will be important for you to follow-up with your primary care physician in 2-3 days. If you develop worsening symptoms such as recurrent falls or fevers, please return to the emergency department immediately.

## 2020-09-16 NOTE — ED NOTES
Pt caregiver reports upon arrival  pt stated to the caregiver  she had fallen down this morning. Pt caregiver reports she found Pt in the bed. Pt states her back and buttocks are in pain. Pt reported she was on her way to the bathroom when she had fallen. Pt has an baseline Alzheimer. Pt stated she does not think she had hit her head during fall. Pt is on the monitor x 2, VSS at this time, Will continue to monitor     1125 Attempted to place lidocaine patch on Pt, However Pt was not able to verbalize which body area hurts, notify MD       1130 X ray at bedside     1255 Pt assisted to [facility] w/ PCT  w/o difficulty     1318 Pt resting on stretcher in POC with call bell in reach. Family at bedside     1645 Pt resting on stretcher in POC with call bell in reach. Pt remains on monitor x 3. VSS at this time.

## 2020-09-19 LAB
BACTERIA SPEC CULT: ABNORMAL
CC UR VC: ABNORMAL
SERVICE CMNT-IMP: ABNORMAL

## 2023-05-10 RX ORDER — ESCITALOPRAM OXALATE 10 MG/1
TABLET ORAL DAILY
COMMUNITY

## 2023-05-10 RX ORDER — AMLODIPINE BESYLATE 5 MG/1
TABLET ORAL DAILY
COMMUNITY

## 2023-05-10 RX ORDER — LISINOPRIL 20 MG/1
20 TABLET ORAL DAILY
COMMUNITY

## 2023-05-10 RX ORDER — LEVOTHYROXINE SODIUM 50 UG/1
CAPSULE ORAL DAILY
COMMUNITY
Start: 2011-06-22